# Patient Record
Sex: FEMALE | URBAN - METROPOLITAN AREA
[De-identification: names, ages, dates, MRNs, and addresses within clinical notes are randomized per-mention and may not be internally consistent; named-entity substitution may affect disease eponyms.]

---

## 2022-05-31 ENCOUNTER — MEDICAL CORRESPONDENCE (OUTPATIENT)
Dept: HEALTH INFORMATION MANAGEMENT | Facility: CLINIC | Age: 37
End: 2022-05-31
Payer: COMMERCIAL

## 2022-06-09 ENCOUNTER — TRANSCRIBE ORDERS (OUTPATIENT)
Dept: OTHER | Age: 37
End: 2022-06-09
Payer: COMMERCIAL

## 2022-06-09 DIAGNOSIS — Z87.442 HISTORY OF NEPHROLITHIASIS: ICD-10-CM

## 2022-06-09 DIAGNOSIS — N20.1 URETEROLITHIASIS: Primary | ICD-10-CM

## 2022-07-07 ENCOUNTER — MEDICAL CORRESPONDENCE (OUTPATIENT)
Dept: HEALTH INFORMATION MANAGEMENT | Facility: CLINIC | Age: 37
End: 2022-07-07

## 2022-07-07 ENCOUNTER — TRANSCRIBE ORDERS (OUTPATIENT)
Dept: OTHER | Age: 37
End: 2022-07-07

## 2022-07-07 DIAGNOSIS — G89.29 OTHER CHRONIC PAIN: Primary | ICD-10-CM

## 2022-07-07 DIAGNOSIS — R10.2 PELVIC PAIN IN FEMALE: ICD-10-CM

## 2022-07-07 DIAGNOSIS — R10.9 FLANK PAIN: ICD-10-CM

## 2022-07-11 ENCOUNTER — TRANSCRIBE ORDERS (OUTPATIENT)
Dept: OTHER | Age: 37
End: 2022-07-11

## 2022-07-11 DIAGNOSIS — N20.1 URETEROLITHIASIS: Primary | ICD-10-CM

## 2022-07-11 DIAGNOSIS — Z87.442 HISTORY OF NEPHROLITHIASIS: ICD-10-CM

## 2022-08-16 ENCOUNTER — OFFICE VISIT (OUTPATIENT)
Dept: OBGYN | Facility: CLINIC | Age: 37
End: 2022-08-16
Payer: COMMERCIAL

## 2022-08-16 VITALS
SYSTOLIC BLOOD PRESSURE: 117 MMHG | WEIGHT: 245.2 LBS | DIASTOLIC BLOOD PRESSURE: 82 MMHG | OXYGEN SATURATION: 98 % | HEART RATE: 95 BPM

## 2022-08-16 DIAGNOSIS — R10.2 CHRONIC PELVIC PAIN IN FEMALE: ICD-10-CM

## 2022-08-16 DIAGNOSIS — G89.29 CHRONIC PELVIC PAIN IN FEMALE: ICD-10-CM

## 2022-08-16 PROCEDURE — 99205 OFFICE O/P NEW HI 60 MIN: CPT | Performed by: OBSTETRICS & GYNECOLOGY

## 2022-08-16 RX ORDER — ALBUTEROL SULFATE 90 UG/1
1-2 AEROSOL, METERED RESPIRATORY (INHALATION)
COMMUNITY
Start: 2021-10-26

## 2022-08-16 RX ORDER — ONDANSETRON 4 MG/1
TABLET, ORALLY DISINTEGRATING ORAL
COMMUNITY
Start: 2022-08-13

## 2022-08-16 RX ORDER — EPINEPHRINE 0.3 MG/.3ML
0.3 INJECTION SUBCUTANEOUS
COMMUNITY

## 2022-08-16 RX ORDER — ESCITALOPRAM OXALATE 20 MG/1
TABLET ORAL
COMMUNITY
Start: 2022-07-27

## 2022-08-16 RX ORDER — ALBUTEROL SULFATE 90 UG/1
AEROSOL, METERED RESPIRATORY (INHALATION)
COMMUNITY
Start: 2022-02-10

## 2022-08-16 RX ORDER — NORTRIPTYLINE HCL 10 MG
CAPSULE ORAL
COMMUNITY
Start: 2022-08-09

## 2022-08-16 RX ORDER — SUMATRIPTAN 20 MG/1
SPRAY NASAL
COMMUNITY
Start: 2021-05-14

## 2022-08-16 RX ORDER — HYDROXYZINE HYDROCHLORIDE 25 MG/1
TABLET, FILM COATED ORAL
COMMUNITY
Start: 2020-08-27

## 2022-08-16 RX ORDER — PROPRANOLOL HYDROCHLORIDE 120 MG/1
CAPSULE, EXTENDED RELEASE ORAL
COMMUNITY
Start: 2022-08-13

## 2022-08-16 RX ORDER — ACETAMINOPHEN 500 MG
1000 TABLET ORAL
COMMUNITY
Start: 2021-08-06

## 2022-08-16 RX ORDER — CALCIUM CARBONATE/VITAMIN D3 500-10/5ML
1 LIQUID (ML) ORAL AT BEDTIME
COMMUNITY

## 2022-08-16 RX ORDER — TIZANIDINE 2 MG/1
4-6 TABLET ORAL
COMMUNITY
Start: 2022-06-09

## 2022-08-16 RX ORDER — OMEPRAZOLE 40 MG/1
CAPSULE, DELAYED RELEASE ORAL
COMMUNITY
Start: 2022-07-12

## 2022-08-16 NOTE — PROGRESS NOTES
Radha is a 36 year old  referred here by self for consultation regarding second Opinion. She is here with her mother. She has a long history of pelvic pain as detailed bellow by her gynecologist.  She has presently started with physical therapy and pain management through the pain clinic.  Her question relate to  why her surgeries have no resolved all per pelvic pain.  I explained that from the review of her records, she was treated with medical treatment, then minimally invasive surgeries and finally definitive hysterectomy.  Recent  imaging did not show any  abnormalities to explain her ongoing pains.  [facility]  Procedure Note    Filippo Ibarra MD - 2022   Formatting of this note might be different from the original.   IMPRESSION   COMPARISON:  CT 2022     TECHNIQUE:  Images were obtained through the abdomen and pelvis without contrast using a renal stone protocol.     FINDINGS:     LUNG BASES: Unremarkable.     LIVER: Unremarkable.     GALLBLADDER AND BILIARY TREE: Unremarkable. No intrahepatic or extrahepatic biliary ductal dilation.     PANCREAS: Unremarkable.     SPLEEN: Small splenule is.     ADRENALS: Unremarkable.     KIDNEYS AND URETERS: Unremarkable. No hydronephrosis.     VESSELS: No abdominal aortic aneurysm.     BOWEL: Unremarkable. No inflammatory changes or obstruction. Normal appendix     BLADDER: Decompressed.     REPRODUCTIVE ORGANS: Hysterectomy. No pelvic mass.     MESENTERY/PERITONEUM: No enlarged mesenteric lymph nodes. No ascites or free air. No focal fluid collection.     RETROPERITONEUM: No adenopathy.     ABDOMINAL WALL/SOFT TISSUES: Unremarkable.     BONES: Unremarkable.     IMPRESSION:  No findings identified to account for provided history of flank pain. No genitourinary stone. No obstructive uropathy. Normal CT appearance of the gallbladder. No dense gallstones.  Exam End: 22  3:46 PM    Specimen Collected: 22  3:35 PM  Last Resulted: 22  3:57 PM   Received From: [facility] (acquired by [employer] in 2014)  Result Received: 22  3:07 PM          Progress Notes  - documented in this encounter    Mercedes Young MD - 2022 11:00 AM CST  Formatting of this note is different from the original.  GYN Problem Focused Visit    Chief complaint: pelvic pain    HPI: Radha Sanon is a 36 y.o.  female who presents for pelvic pain. She is well known to me. Last visit 2021 for a post-op visit. She underwent TLH, bilateral salpingectomy for dysmenorrhea, pelvic pain, recurrent fibroids.   Following surgery she was diagnosed with kidney stones and required multiple procedures.   Since this visit she has been diagnosed with Graves disease and is being followed by endocrinology.  She continues to struggle with pelvic pain and has been working with Dr. Schober who is managing all narcotic Rx.   She had a pelvic uls this am with no etiology noted for pain. Ovaries not definitively seen, but no adnexal masses or cysts. She has had numerous studies (CT and uls) to try and figure out cause of pain.    Pain feels like it is vaginal. Unsure exact timing of pain onset, maybe September. She can no longer work due to the pain. She cannot tolerate any vaginal penetration due to pain. When asked if the pain is with insertion or deep she thinks it is both. Does not feel like there is pain with urination or BM.   Dull during the day. Sharp at night. Pain meds help. Not sure if muscle relaxants help.  Pain worse now after uls. No abnormal vaginal bleeding or discharge.       PMH:  Past Medical History:   Diagnosis Date     Asthma ([facility])     Chronic kidney disease ([facility])   stones     Headache     Heartburn     IIH (idiopathic intracranial hypertension)    was on Topamax for short time     Irritable bowel syndrome     Obesity ([facility])     Urinary tract infection     Varicella   Grave's disease.     PSH:  Past Surgical History:   Procedure Laterality Date      "CYSTOSCOPY 06/21/2021   Stent placement, kidney stones     HYSTERECTOMY     LAPAROSCOPY     LAPAROTOMY 5/27/15   Diagnostic hysteroscopy, resection of intracavitaryl fibroids with Myosure device, laparotomy, myomectomy     MYOMECTOMY 5/27/15   hysteroscopic and open     MYOMECTOMY 11/01/2012   hysteroscopic     MYOMECTOMY 09/06/2017   Hysteroscopic /Myosure     MYOMECTOMY 06/19/2019   Hysteroscopic     OPERATIVE HYSTEROSCOPY   myomectomy, 11/1/12     OPERATIVE HYSTEROSCOPY 06/19/2019   Diagnostic laparoscopy, hysteroscopy, myomectomy, placement of intrauterine balloon     TONSILLECTOMY     Total laparoscopic hysterectomy, bilateral salpingectomy 06/04/2021     WISDOM TEETH EXTRACTION     Physical exam:   /73 (BP Location: Left Arm, BP Cuff Size: Regular - Long)  Pulse 64  Ht 5' 8\" (1.727 m)  Wt 187 lb 8 oz (85 kg)  LMP 05/27/2020  BMI 28.51 kg/m    OBJECTIVE:   General appearance: alert, cooperative, appears guarded prior to exam and is tearful and in fetal position following exam.  Psychiatric: affect appropriate   Gyn: has normal appearing external genitalia, vaginal mucose and vaginal cuff. She has normal physiologic discharge. She has pain the moment I try to pass the speculum and start the digital exam. Pain intensified with palpation of pelvic floor, but really hard to determine if exact origin for pain.       Assessment: Pelvic pain following TLH, bilateral salpingectomy for recurrent fibroids    Plan: Her exam does not support any concern for post-op complication. She has healed completely and vaginal cuff is not the site of tenderness. I discussed with her the possible etiologies for pain including myofascial pelvic pain syndrome, neuropathic pain or central sensitization. Given her extreme pain with exam, I would like her to see the pain clinic first. I do not think pelvic floor PT could assess, nonetheless treat, any pelvic floor issues without some deescalating of current pain. I converted her " "pain consult to urgent given the number of ER visits, escalating pain, need/desire to use more and more narcotics.     Total time: 30 minutes   Time spent reviewing chart prior to appt, Taking a complete history, reviewing uls, preparing plan of care, communicating with Dr. Schober and referral to pain clinic.    Mercedes Young MD      Electronically signed by Mercedes Young MD at 02/24/2022 8:02 PM CST\"    ROS: Ten point review of systems was reviewed and negative except the above.        Past Medical History:   Diagnosis Date     Graves disease      Kidney stone      Migraines      Past Surgical History:   Procedure Laterality Date     ADENOIDECTOMY       EXTRACORPOREAL SHOCK WAVE LITHOTRIPSY (ESWL)       HYSTERECTOMY  06/04/2021     MYOMECTOMY       TONSILLECTOMY       There is no problem list on file for this patient.      ALL/Meds: Her medication and allergy histories were reviewed and are documented in their appropriate chart areas.    SH: - tob, - EtOH,     FH: Her family history was reviewed and documented in its appropriate chart area.    PE: /82   Pulse 95   Wt 111.2 kg (245 lb 3.2 oz)   SpO2 98%   Breastfeeding No   There is no height or weight on file to calculate BMI.    General Appearance:  healthy, alert, active, no distress  HEENT: NCAT    A/P    ICD-10-CM    1. Chronic pelvic pain in female  R10.2     G89.29      I recommended to continue with Physical Therapy and Pain Clinic and set new goals to mage pain control for her daily activities of life.  She and her mother had all her questions answered to her satisfaction.  Total time preparing to see patient with reviewing prior encounter and labs, face to face time,  and coordinating care on the same calendar date:60 mins    CEPHAS AGBEH, MD.   "

## 2022-08-16 NOTE — PATIENT INSTRUCTIONS
To Schedule an Appointment 24/7  Call: [phone]    If you have any questions regarding your visit, Please contact your care team.  [facility]: [phone]  [facility] HOURS TELEPHONE NUMBER   Cephas Agbeh, M.D.      [facility]-Surgery Scheduler  [facility]-Surgery Scheduler         Monday - [place]:    8:00 am-4:45 pm  Tuesday - [place]:   8:00 am-4:45 pm  Friday-[place]:       8:00 am-4:45 pm  Typical Surgery Day:  Wednesday [facility]   [address]   [place] [id number] [phone]   Fax [phone]    Imaging Scheduling all locations  [phone]      [facility] Labor and Delivery   [address] [place] [address] [address].   [place], [place] [address] [phone]    [facility]  [address] [facility] [address]  [phone]  Fax [phone]     Urgent Care locations:  [facility] Monday-Friday                               10 am - 8 pm  Saturday and Sunday                      9 am - 5 pm  Monday-Friday                              10 am- 8 pm  Saturday and Sunday                      9 am - 5 pm    [phone]   **Surgeries** Our Surgery Schedulers will contact you to schedule. If you do not receive a call within 3 business days, please call [phone].  If you need a medication refill, please contact your pharmacy. Please allow 3 business days for your refill to be completed.  As always, Thank you for trusting us with your healthcare needs!  see additional instructions from your care team below

## 2025-02-16 ENCOUNTER — HEALTH MAINTENANCE LETTER (OUTPATIENT)
Age: 40
End: 2025-02-16

## 2025-04-05 ENCOUNTER — TRANSFERRED RECORDS (OUTPATIENT)
Dept: MULTI SPECIALTY CLINIC | Facility: CLINIC | Age: 40
End: 2025-04-05

## 2025-04-05 LAB — PAP SMEAR - HIM PATIENT REPORTED: NORMAL

## 2025-04-08 PROBLEM — R10.9 ABDOMINAL PAIN: Status: ACTIVE | Noted: 2021-07-26

## 2025-04-08 PROBLEM — K76.0 FATTY LIVER: Status: ACTIVE | Noted: 2018-11-07

## 2025-04-08 PROBLEM — N30.00 ACUTE CYSTITIS WITHOUT HEMATURIA: Status: ACTIVE | Noted: 2025-04-08

## 2025-04-08 PROBLEM — N12 PYELONEPHRITIS: Status: ACTIVE | Noted: 2021-07-05

## 2025-04-08 PROBLEM — N20.1 URETEROLITHIASIS: Status: ACTIVE | Noted: 2022-03-19

## 2025-04-08 PROBLEM — Z87.442 HISTORY OF NEPHROLITHIASIS: Status: ACTIVE | Noted: 2018-05-03

## 2025-04-08 PROBLEM — N20.0 NEPHROLITHIASIS: Status: ACTIVE | Noted: 2021-06-21

## 2025-04-08 PROBLEM — E66.9 OBESITY (BMI 30-39.9): Status: ACTIVE | Noted: 2017-10-31

## 2025-04-08 PROBLEM — G89.4 CHRONIC PAIN DISORDER: Status: ACTIVE | Noted: 2022-04-19

## 2025-04-08 PROBLEM — R10.2 FEMALE PELVIC PAIN: Status: ACTIVE | Noted: 2020-06-20

## 2025-04-08 PROBLEM — K58.9 IRRITABLE BOWEL SYNDROME: Status: ACTIVE | Noted: 2021-07-05

## 2025-04-08 PROBLEM — Z90.710 STATUS POST LAPAROSCOPIC HYSTERECTOMY: Status: ACTIVE | Noted: 2021-06-04

## 2025-04-09 ENCOUNTER — OFFICE VISIT (OUTPATIENT)
Dept: OBGYN | Facility: CLINIC | Age: 40
End: 2025-04-09
Attending: OBSTETRICS & GYNECOLOGY
Payer: COMMERCIAL

## 2025-04-09 VITALS
SYSTOLIC BLOOD PRESSURE: 139 MMHG | WEIGHT: 254.6 LBS | HEIGHT: 68 IN | DIASTOLIC BLOOD PRESSURE: 88 MMHG | BODY MASS INDEX: 38.58 KG/M2 | HEART RATE: 66 BPM

## 2025-04-09 DIAGNOSIS — R10.2 CHRONIC PELVIC PAIN IN FEMALE: Primary | ICD-10-CM

## 2025-04-09 DIAGNOSIS — G89.29 CHRONIC PELVIC PAIN IN FEMALE: Primary | ICD-10-CM

## 2025-04-09 PROCEDURE — G0463 HOSPITAL OUTPT CLINIC VISIT: HCPCS | Performed by: OBSTETRICS & GYNECOLOGY

## 2025-04-09 NOTE — PROGRESS NOTES
Gynecology Visit Note  4/9/2025    Reason for visit: Chronic Pelvic Pain    HPI: Patient is a 38 yo P0 who presents today with concerns of chronic pelvic pain.  Patient has had longstanding issues with CPP initially treated with medication management, minimally invasive surgical methods (5 hysteroscopic myomectomies, one abdominal myomectomy via Pfannenstiel skin incision, one diagnostic laparoscopy) and ultimate completion of TLH, BS on 6/4/2021 for menorrhagia, dysmenorrhea, pelvic pain, recurrent fibroids and chronic blood loss anemia at outside facility. Operative findings described at time of TLH as Normal upper abdomen, Normal fallopian tubes and ovaries bilaterally, and Uterus with at least 3 visible subserousal fibroids. Following surgery she was diagnosed with kidney stones and required multiple procedures. Patient has a history Graves disease for which she follows with endocrinology.  She has previously been seen in a pain clinic and had a narcotic contract in the past with Dr. Schober.  Has trialed tizanidine, nortriptyline, gabapentin, an Pelvic PT.  Patient has had multiple imaging studies (Pelvic US and CT scan) completed since that have not shown any obvious pelvic pathology to cause her pain.    Her pain has been daily and persistent since her mid 20's, although she has been dealing with ovarian cysts and fibroids since her teens. She describes the pain, which has been unchanged over the years, as deep pelvic pain that is sharp, stabbing, and stinging. It is not associated with certain activities, although physical activity worsens it. She denies any dysuria, dyspareunia, tenderness of the vulva or vagina, constipation, or diarrhea. Pain medications have helped somewhat over the years, as well as cannabis, but nothing has ever fully relieved the pain. She is worried there is scar tissue, and is interested in surgery to identify the etiology because she feels that it cannot make the pain any worse and  nothing she is currently doing seems to be assisting either.    Prior laparoscopy/open procedures Operative Notes reviewed with findings below.  6/2019: Diagnostic laparoscopy at time of hysteroscopic myomectomy noted filmy adhesions to right fundal area of her uterus.   5/2015: XL, Abdominal myomectomy, Hysteroscopic myomectomy: Intrauterine cavity: Normal endometrium, with 2, 1cm pedunculated fibroids within uterine cavity. Left tubal ostia identified. To the right and anterior there was bulging in of a submucous vs transmural fibroid (transmural confirmed abdominally).  5 fibroids noted abdominally. Intramural - 8cm fundal and to patient's right, subserosal - 4cm anterior/fundal to the left, 3 pedunculated - each 1cm, 1 fundal, 1 anterior low right and 1 posterior left.  Interceed was placed over uterine incisions.      Past OB/GYN History:  P0  Menses: s/p [facility]  Pap smear History:Last 5/2019 NILM, HPV Negative, pathology from [facility] was benign  Sexually active: Yes, with boyfriend  STI History: Did not discuss  Contraception: s/p [facility]     Past Medical History:   Diagnosis Date    Asthma ([facility])    Chronic kidney disease ([facility])   stones    Headache    Heartburn    IIH (idiopathic intracranial hypertension) 2000   was on Topamax for short time    Irritable bowel syndrome    Obesity ([facility])    Urinary tract infection    Varicella   Grave's disease.     PSH:  Past Surgical History:   Procedure Laterality Date    CYSTOSCOPY 06/21/2021   Stent placement, kidney stones    HYSTERECTOMY    LAPAROSCOPY    LAPAROTOMY 5/27/15   Diagnostic hysteroscopy, resection of intracavitaryl fibroids with Myosure device, laparotomy, myomectomy    MYOMECTOMY 5/27/15   hysteroscopic and open    MYOMECTOMY 11/01/2012   hysteroscopic    MYOMECTOMY 09/06/2017   Hysteroscopic /Myosure    MYOMECTOMY 06/19/2019   Hysteroscopic    OPERATIVE HYSTEROSCOPY   myomectomy, 11/1/12    OPERATIVE HYSTEROSCOPY 06/19/2019   Diagnostic laparoscopy, hysteroscopy,  myomectomy, placement of intrauterine balloon    TONSILLECTOMY    Total laparoscopic hysterectomy, bilateral salpingectomy 06/04/2021    WISDOM TEETH EXTRACTION     Current Outpatient Medications   Medication Sig Dispense Refill    acetaminophen (TYLENOL) 500 MG tablet Take 1,000 mg by mouth      albuterol (PROAIR HFA/PROVENTIL HFA/VENTOLIN HFA) 108 (90 Base) MCG/ACT inhaler Inhale 1-2 puffs into the lungs      cholecalciferol (VITAMIN D3) 25 mcg (1000 units) capsule Take 1,000 Units by mouth      EPINEPHrine (ANY BX GENERIC EQUIV) 0.3 MG/0.3ML injection 2-pack Inject 0.3 mg into the muscle      escitalopram (LEXAPRO) 20 MG tablet       hydrOXYzine (ATARAX) 25 MG tablet TAKE 1 TO 2 TABLETS BY MOUTH TWICE DAILY      magnesium oxide 400 MG CAPS Take 1 tablet by mouth At Bedtime      nortriptyline (PAMELOR) 10 MG capsule       omeprazole (PRILOSEC) 40 MG DR capsule       ondansetron (ZOFRAN ODT) 4 MG ODT tab DISSOLVE 1-2 TABLETS BY MOUTH UP TO TWICE DAILY AS NEEDED FOR NAUSEA SYMPTOMS RELATED TO MIGRAINE. MAX 9 DAYS PER MONTH.      propranolol ER (INDERAL LA) 120 MG 24 hr capsule TAKE 1 CAPSULE BY MOUTH EVERY DAY      SUMAtriptan (IMITREX) 20 MG/ACT nasal spray USE 1 SPRAY AT ONSET OF MIGRAINE. MAY REPEAT IN 2 HOURS. MAX 2 PER DAY AND 9 DAYS PER MONTH      tiZANidine (ZANAFLEX) 2 MG tablet Take 4-6 mg by mouth      tiZANidine (ZANAFLEX) 4 MG tablet       VENTOLIN  (90 Base) MCG/ACT inhaler INHALE 1 TO 2 PUFFS BY MOUTH EVERY 4 HOURS AS NEEDED FOR WHEEZING      vitamin B-12 (CYANOCOBALAMIN) 1000 MCG tablet Take 1,000 mcg by mouth       No current facility-administered medications for this visit.      Allergies   Allergen Reactions    Aspirin Angioedema and Other (See Comments)     angioedema      Sulfa Antibiotics Difficulty breathing    Sulfamethoxazole-Trimethoprim Shortness Of Breath      Social History     Tobacco Use    Smoking status: Never    Smokeless tobacco: Never   Vaping Use    Vaping status: Never  "Used   Substance Use Topics    Alcohol use: Yes     Comment: maybe 4 times a month      Family History   Problem Relation Age of Onset    Diabetes Father       ROS: A complete 10 point ROS was conducted and was negative aside from that noted in the HPI    O:  /88   Pulse 66   Ht 1.727 m (5' 8\")   Wt 115.5 kg (254 lb 9.6 oz)   BMI 38.71 kg/m    General: NAD, A&Ox3  Resp: Nonlabored breathing    Pathology 6/4/2021:  Uterus and bilateral fallopian tubes, hysterectomy and salpingectomy-Benign uterus with benign leiomyomas, unremarkable fallopian tubes.     CT 11/1/2023:  FINDINGS:   LOWER CHEST: Normal.     HEPATOBILIARY: Mild hepatic steatosis. Normal gallbladder.     PANCREAS: Normal.     SPLEEN: Normal.     ADRENAL GLANDS: Normal.     KIDNEYS/BLADDER: Normal.     BOWEL: Normal appendix. No bowel obstruction or bowel wall thickening.     LYMPH NODES: Normal.     VASCULATURE: No abdominal aortic aneurysm.     PELVIC ORGANS: Absent uterus. No free fluid.     MUSCULOSKELETAL: Normal.     IMPRESSION:   1.  Mild hepatic steatosis.     2.  No focal inflammatory change.     A/P: 38 yo P0 presents with concerns of CPP and multiple failed modalities of management  1) CPP: Patient has tried all typical modalities for assisting with CPP including attempt with definitive management with TLH, Pain Clinic, PFT, Gabapentin, Tinazidine, and Nortriptyline and despite these interventions continues to have pain.  Patient is interested in having diagnostic surgery to evaluate for potential scar tissue or structural lesions that are contributing to her pain.  We discussed this could be considered in addition to peripheral nerve block.  She understands if we move towards surgery that if scar tissue is present and adhesions are taken down, there is always potential for these to come back and potentially be worse.  She also understands that even if a surgery is done, it may not relieve her pain.  Patient is searching for answers " and feels that current modalities have been unsuccessful in addressing her pain and negative impacts on quality of life.  Discussed with patient will discuss with robotic surgeons in our group about their openness to proceeding with RA-diagnostic laparoscopy, possible JABIER.  Will see if any further imaging warranted although all prior imaging has been without obvious findings.  Will contact patient once able to connect with robotic surgeons to see if anyone is open to completing this case and then make further plans from there.  Patient understands and is agreeable with this plan.    Dianna Peña MD

## 2025-04-09 NOTE — PATIENT INSTRUCTIONS
Thank you for trusting us with your care!   Please be aware, if you are on Mychart, you may see your results prior to your providers review. If labs are abnormal, we will call or message you on [employer] with a follow up plan.    If you need to contact us for questions about:  Symptoms, Scheduling & Medical Questions; Non-urgent (2-3 day response) A's Child message, Urgent (needing response today) [phone] (if after 3:30pm next day response)   Prescriptions: Please call your Pharmacy   Billing: [place] [phone] or [facility]:[phone]

## 2025-04-09 NOTE — LETTER
4/9/2025       RE: Radha Sanon  [address] [facility] [address]     Dear Colleague,    Thank you for referring your patient, Radha Sanon, to the [facility] [place] at [facility]. Please see a copy of my visit note below.    Gynecology Visit Note  4/9/2025    Reason for visit: Chronic Pelvic Pain    HPI: Patient is a 38 yo P0 who presents today with concerns of chronic pelvic pain.  Patient has had longstanding issues with CPP initially treated with medication management, minimally invasive surgical methods (5 hysteroscopic myomectomies, one abdominal myomectomy via Pfannenstiel skin incision, one diagnostic laparoscopy) and ultimate completion of TLH, BS on 6/4/2021 for menorrhagia, dysmenorrhea, pelvic pain, recurrent fibroids and chronic blood loss anemia at outside facility. Operative findings described at time of TLH as Normal upper abdomen, Normal fallopian tubes and ovaries bilaterally, and Uterus with at least 3 visible subserousal fibroids. Following surgery she was diagnosed with kidney stones and required multiple procedures. Patient has a history Graves disease for which she follows with endocrinology.  She has previously been seen in a pain clinic and had a narcotic contract in the past with Dr. Schober.  Has trialed tizanidine, nortriptyline, gabapentin, an Pelvic PT.  Patient has had multiple imaging studies (Pelvic US and CT scan) completed since that have not shown any obvious pelvic pathology to cause her pain.    Her pain has been daily and persistent since her mid 20's, although she has been dealing with ovarian cysts and fibroids since her teens. She describes the pain, which has been unchanged over the years, as deep pelvic pain that is sharp, stabbing, and stinging. It is not associated with certain activities, although physical activity worsens it. She denies any dysuria, dyspareunia, tenderness of  the vulva or vagina, constipation, or diarrhea. Pain medications have helped somewhat over the years, as well as cannabis, but nothing has ever fully relieved the pain. She is worried there is scar tissue, and is interested in surgery to identify the etiology because she feels that it cannot make the pain any worse and nothing she is currently doing seems to be assisting either.    Prior laparoscopy/open procedures Operative Notes reviewed with findings below.  6/2019: Diagnostic laparoscopy at time of hysteroscopic myomectomy noted filmy adhesions to right fundal area of her uterus.   5/2015: XL, Abdominal myomectomy, Hysteroscopic myomectomy: Intrauterine cavity: Normal endometrium, with 2, 1cm pedunculated fibroids within uterine cavity. Left tubal ostia identified. To the right and anterior there was bulging in of a submucous vs transmural fibroid (transmural confirmed abdominally).  5 fibroids noted abdominally. Intramural - 8cm fundal and to patient's right, subserosal - 4cm anterior/fundal to the left, 3 pedunculated - each 1cm, 1 fundal, 1 anterior low right and 1 posterior left.  Interceed was placed over uterine incisions.      Past OB/GYN History:  P0  Menses: s/p [facility]  Pap smear History:Last 5/2019 NILM, HPV Negative, pathology from [facility] was benign  Sexually active: Yes, with boyfriend  STI History: Did not discuss  Contraception: s/p [facility]     Past Medical History:   Diagnosis Date     Asthma ([facility])     Chronic kidney disease ([facility])   stones     Headache     Heartburn     IIH (idiopathic intracranial hypertension) 2000   was on Topamax for short time     Irritable bowel syndrome     Obesity ([facility])     Urinary tract infection     Varicella   Grave's disease.     PSH:  Past Surgical History:   Procedure Laterality Date     CYSTOSCOPY 06/21/2021   Stent placement, kidney stones     HYSTERECTOMY     LAPAROSCOPY     LAPAROTOMY 5/27/15   Diagnostic hysteroscopy, resection of intracavitaryl fibroids with Myosure  device, laparotomy, myomectomy     MYOMECTOMY 5/27/15   hysteroscopic and open     MYOMECTOMY 11/01/2012   hysteroscopic     MYOMECTOMY 09/06/2017   Hysteroscopic /Myosure     MYOMECTOMY 06/19/2019   Hysteroscopic     OPERATIVE HYSTEROSCOPY   myomectomy, 11/1/12     OPERATIVE HYSTEROSCOPY 06/19/2019   Diagnostic laparoscopy, hysteroscopy, myomectomy, placement of intrauterine balloon     TONSILLECTOMY     Total laparoscopic hysterectomy, bilateral salpingectomy 06/04/2021     WISDOM TEETH EXTRACTION     Current Outpatient Medications   Medication Sig Dispense Refill     acetaminophen (TYLENOL) 500 MG tablet Take 1,000 mg by mouth       albuterol (PROAIR HFA/PROVENTIL HFA/VENTOLIN HFA) 108 (90 Base) MCG/ACT inhaler Inhale 1-2 puffs into the lungs       cholecalciferol (VITAMIN D3) 25 mcg (1000 units) capsule Take 1,000 Units by mouth       EPINEPHrine (ANY BX GENERIC EQUIV) 0.3 MG/0.3ML injection 2-pack Inject 0.3 mg into the muscle       escitalopram (LEXAPRO) 20 MG tablet        hydrOXYzine (ATARAX) 25 MG tablet TAKE 1 TO 2 TABLETS BY MOUTH TWICE DAILY       magnesium oxide 400 MG CAPS Take 1 tablet by mouth At Bedtime       nortriptyline (PAMELOR) 10 MG capsule        omeprazole (PRILOSEC) 40 MG DR capsule        ondansetron (ZOFRAN ODT) 4 MG ODT tab DISSOLVE 1-2 TABLETS BY MOUTH UP TO TWICE DAILY AS NEEDED FOR NAUSEA SYMPTOMS RELATED TO MIGRAINE. MAX 9 DAYS PER MONTH.       propranolol ER (INDERAL LA) 120 MG 24 hr capsule TAKE 1 CAPSULE BY MOUTH EVERY DAY       SUMAtriptan (IMITREX) 20 MG/ACT nasal spray USE 1 SPRAY AT ONSET OF MIGRAINE. MAY REPEAT IN 2 HOURS. MAX 2 PER DAY AND 9 DAYS PER MONTH       tiZANidine (ZANAFLEX) 2 MG tablet Take 4-6 mg by mouth       tiZANidine (ZANAFLEX) 4 MG tablet        VENTOLIN  (90 Base) MCG/ACT inhaler INHALE 1 TO 2 PUFFS BY MOUTH EVERY 4 HOURS AS NEEDED FOR WHEEZING       vitamin B-12 (CYANOCOBALAMIN) 1000 MCG tablet Take 1,000 mcg by mouth       No current  "facility-administered medications for this visit.      Allergies   Allergen Reactions     Aspirin Angioedema and Other (See Comments)     angioedema       Sulfa Antibiotics Difficulty breathing     Sulfamethoxazole-Trimethoprim Shortness Of Breath      Social History     Tobacco Use     Smoking status: Never     Smokeless tobacco: Never   Vaping Use     Vaping status: Never Used   Substance Use Topics     Alcohol use: Yes     Comment: maybe 4 times a month      Family History   Problem Relation Age of Onset     Diabetes Father       ROS: A complete 10 point ROS was conducted and was negative aside from that noted in the HPI    O:  /88   Pulse 66   Ht 1.727 m (5' 8\")   Wt 115.5 kg (254 lb 9.6 oz)   BMI 38.71 kg/m    General: NAD, A&Ox3  Resp: Nonlabored breathing    Pathology 6/4/2021:  Uterus and bilateral fallopian tubes, hysterectomy and salpingectomy-Benign uterus with benign leiomyomas, unremarkable fallopian tubes.     CT 11/1/2023:  FINDINGS:   LOWER CHEST: Normal.     HEPATOBILIARY: Mild hepatic steatosis. Normal gallbladder.     PANCREAS: Normal.     SPLEEN: Normal.     ADRENAL GLANDS: Normal.     KIDNEYS/BLADDER: Normal.     BOWEL: Normal appendix. No bowel obstruction or bowel wall thickening.     LYMPH NODES: Normal.     VASCULATURE: No abdominal aortic aneurysm.     PELVIC ORGANS: Absent uterus. No free fluid.     MUSCULOSKELETAL: Normal.     IMPRESSION:   1.  Mild hepatic steatosis.     2.  No focal inflammatory change.     A/P: 38 yo P0 presents with concerns of CPP and multiple failed modalities of management  1) CPP: Patient has tried all typical modalities for assisting with CPP including attempt with definitive management with TL, Pain Clinic, PFT, Gabapentin, Tinazidine, and Nortriptyline and despite these interventions continues to have pain.  Patient is interested in having diagnostic surgery to evaluate for potential scar tissue or structural lesions that are contributing to her " pain.  We discussed this could be considered in addition to peripheral nerve block.  She understands if we move towards surgery that if scar tissue is present and adhesions are taken down, there is always potential for these to come back and potentially be worse.  She also understands that even if a surgery is done, it may not relieve her pain.  Patient is searching for answers and feels that current modalities have been unsuccessful in addressing her pain and negative impacts on quality of life.  Discussed with patient will discuss with robotic surgeons in our group about their openness to proceeding with RA-diagnostic laparoscopy, possible JABIER.  Will see if any further imaging warranted although all prior imaging has been without obvious findings.  Will contact patient once able to connect with robotic surgeons to see if anyone is open to completing this case and then make further plans from there.  Patient understands and is agreeable with this plan.    Dianna Peña MD      Again, thank you for allowing me to participate in the care of your patient.      Sincerely,    Dianna Peña MD

## 2025-04-16 ENCOUNTER — TELEPHONE (OUTPATIENT)
Dept: OBGYN | Facility: CLINIC | Age: 40
End: 2025-04-16
Payer: COMMERCIAL

## 2025-04-16 NOTE — TELEPHONE ENCOUNTER
Dianna Peña MD  [facility]  Caller: Unspecified (Today,  1:29 PM)  She needs to go to the ED, she did not have pain like that when I assessed her.  I am still waiting to hear back from the robotic docs, I will send a repeat message tomorrow.  She has a pain clinic she has followed with in the past and she also should reach out to them as she has previously had a pain contract with them.  I do not want to get in a situation where we are giving her narcotics.  That being said, she should absolutely go to ED as that pain is much different than when she saw me and walked into our clinic without issue!  Thank you!      Relayed information to patient. No questions at end of message.

## 2025-04-16 NOTE — TELEPHONE ENCOUNTER
Patient requesting pain medication from Dr. Peña for 10/10 pain. LOV w/ Dr. Peña on 4/9/25- Patient c/o 10/10 pelvic pain that is shooting down her leg. She has tried laying on the ground and standing in the shower. RN may have misheard patient, but may have heard patient state she tried Marijuana (a lot of background noise and asked the patient to repeat several times and couldn't hear word). None of the relief measures the patient has tried are working. RN advised patient to go to the ED since her pain is 10/10. OV notes state that Dr. Peña will reach out to robotics provider about surgery. Will message Dr. Peña for advise.

## 2025-04-28 ENCOUNTER — TELEPHONE (OUTPATIENT)
Dept: OBGYN | Facility: CLINIC | Age: 40
End: 2025-04-28
Payer: COMMERCIAL

## 2025-04-28 NOTE — TELEPHONE ENCOUNTER
Attempted to call patient to discuss what we could offer her with regards to medical management and surgical management of her chronic pain after discussing consensus with our robotic surgeons.  Patient did not answer phone and left message that I was trying to connect with her to make plans and see what she would want to do.  Will attempt to give patient a call again later today between meetings.    Dianna Peña MD

## 2025-05-07 ENCOUNTER — TELEPHONE (OUTPATIENT)
Dept: OBGYN | Facility: CLINIC | Age: 40
End: 2025-05-07
Payer: COMMERCIAL

## 2025-05-07 NOTE — TELEPHONE ENCOUNTER
[facility] Call Center    Phone Message    May a detailed message be left on voicemail: yes     Reason for Call: Other: Pt is returning the provider's call from 4/28 as she never heard back. Pt would like to discuss further what her options are and proceed with next steps. Please review and call pt to discuss.     Action Taken: Other: [facility]    Travel Screening: Not Applicable     Date of Service:

## 2025-05-12 NOTE — CONFIDENTIAL NOTE
"\"I did attempt to call this patient today. It went to [employer]. It says the voicemail is full. I am documenting this so people know I did attempt to reach out to her but can not leave a message saying I am doing this to her. If she calls please let her know attempts have been made. I will continue to try to connect with her\". Dr. Peña (5/8/25)    My Chart message sent to Radha with this information  "

## 2025-07-02 ENCOUNTER — MYC MEDICAL ADVICE (OUTPATIENT)
Dept: OBGYN | Facility: CLINIC | Age: 40
End: 2025-07-02
Payer: COMMERCIAL

## 2025-07-02 DIAGNOSIS — N95.1 PERIMENOPAUSAL VASOMOTOR SYMPTOMS: Primary | ICD-10-CM

## 2025-07-02 DIAGNOSIS — R10.2 CHRONIC PELVIC PAIN IN FEMALE: Primary | ICD-10-CM

## 2025-07-02 DIAGNOSIS — G89.29 CHRONIC PELVIC PAIN IN FEMALE: Primary | ICD-10-CM

## 2025-07-02 RX ORDER — ACETAMINOPHEN 325 MG/1
975 TABLET ORAL ONCE
OUTPATIENT
Start: 2025-07-02 | End: 2025-07-02

## 2025-07-02 RX ORDER — PHENAZOPYRIDINE HYDROCHLORIDE 100 MG/1
200 TABLET, FILM COATED ORAL ONCE
OUTPATIENT
Start: 2025-07-02 | End: 2025-07-02

## 2025-07-02 NOTE — PROGRESS NOTES
Able to connect with patient to discuss desires for future management.  Per patient has tried GnRH meds in past and did not have any benefit from this.  She continues to struggle with the same symptoms she had when I last saw her in 4/2025.  She does highly desire to proceed with surgery to try to get some answers.  We did discuss considerations for bilateral oophorectomy at the time of her surgery as these are her last remaining pelvic structures following her TLH, BS in 6/2021.  We discussed risks ane benefits of this and considerations for surgical menopause which we could manage with ERT postoperatively.  Patient states she has been having symptoms of hot flashes and night sweats so things maybe she is already going through this transition although is unsure if could also be related to her Graves disease.  Did offer to get labs to evaluate for potential menopause given can not be directed by bleeding given prior hysterectomy.  Patient is open to doing this.  That being said, at this point, patient would want to proceed with surgery as does not want to have regrets and avoid need for future surgery.  This is reasonable.  I did have a very marquis discussion with the patient that while we would hope this surgery may help with her pain, it may not, and she does voice understanding of this but also feels after having the surgery at least she will have peace there is nothing else she could have done.  Plan for RA-BSO, possible JABIER.  Surgical request placed today.  Discussed need for preop with PCP within 30 days of surgery date.  Also discussed expected recovery from procedure and planned followup postoperatively.      Dianna Peña MD

## 2025-07-09 ENCOUNTER — TELEPHONE (OUTPATIENT)
Dept: OBGYN | Facility: CLINIC | Age: 40
End: 2025-07-09
Payer: COMMERCIAL

## 2025-07-09 NOTE — TELEPHONE ENCOUNTER
Spoke with patient, scheduled surgery. Patient is aware of date, time, location, prep instructions, need for H&P within 30 days.    Type of surgery: ROBOT-ASSISTED BILATERAL OOPHORECTOMY, (Bilateral)   POSSIBLE LYSIS OF ADHESIONS   Location of surgery: [facility] [place] [facility]  Date and time of surgery: 8/22/25 @ 11:10am  Surgeon: Dr. Ivy Bird  Pre-Op H&P Date: 10-30 days with PCP  Post-Op Appt Date: 4-6 weeks after surgery   Packet sent out: Yes  Pre-cert/Authorization completed:  No  Date: 7/9/25

## 2025-07-22 ENCOUNTER — TELEPHONE (OUTPATIENT)
Dept: OBGYN | Facility: CLINIC | Age: 40
End: 2025-07-22
Payer: COMMERCIAL

## 2025-07-22 NOTE — TELEPHONE ENCOUNTER
----- Message from Catherine SINGH sent at 7/17/2025 10:01 AM CDT -----  Regarding: Post op with Marge Whalen can someone reach out to this patient and help them schedule a 4-6 week post op appointment with Dr. Bird    DOS: 8/22    Please remind them they need a pre-op 10-30 days prior to the procedure    Thanks    Rosalva

## 2025-08-20 ENCOUNTER — OFFICE VISIT (OUTPATIENT)
Dept: FAMILY MEDICINE | Facility: CLINIC | Age: 40
End: 2025-08-20
Payer: COMMERCIAL

## 2025-08-20 ENCOUNTER — TELEPHONE (OUTPATIENT)
Dept: FAMILY MEDICINE | Facility: CLINIC | Age: 40
End: 2025-08-20

## 2025-08-20 VITALS
SYSTOLIC BLOOD PRESSURE: 134 MMHG | TEMPERATURE: 97.3 F | DIASTOLIC BLOOD PRESSURE: 80 MMHG | HEART RATE: 63 BPM | HEIGHT: 68 IN | BODY MASS INDEX: 34.4 KG/M2 | OXYGEN SATURATION: 99 % | RESPIRATION RATE: 16 BRPM | WEIGHT: 227 LBS

## 2025-08-20 DIAGNOSIS — Z01.818 PREOP GENERAL PHYSICAL EXAM: Primary | ICD-10-CM

## 2025-08-20 DIAGNOSIS — R10.2 PELVIC PAIN IN FEMALE: ICD-10-CM

## 2025-08-20 LAB
ANION GAP SERPL CALCULATED.3IONS-SCNC: 10 MMOL/L (ref 7–15)
BUN SERPL-MCNC: 6.2 MG/DL (ref 6–20)
CALCIUM SERPL-MCNC: 9.3 MG/DL (ref 8.8–10.4)
CHLORIDE SERPL-SCNC: 102 MMOL/L (ref 98–107)
CREAT SERPL-MCNC: 0.91 MG/DL (ref 0.51–0.95)
EGFRCR SERPLBLD CKD-EPI 2021: 81 ML/MIN/1.73M2
ERYTHROCYTE [DISTWIDTH] IN BLOOD BY AUTOMATED COUNT: 12.6 % (ref 10–15)
GLUCOSE SERPL-MCNC: 84 MG/DL (ref 70–99)
HCO3 SERPL-SCNC: 28 MMOL/L (ref 22–29)
HCT VFR BLD AUTO: 38.6 % (ref 35–47)
HGB BLD-MCNC: 12 G/DL (ref 11.7–15.7)
MCH RBC QN AUTO: 26.5 PG (ref 26.5–33)
MCHC RBC AUTO-ENTMCNC: 31.1 G/DL (ref 31.5–36.5)
MCV RBC AUTO: 85.2 FL (ref 78–100)
PLATELET # BLD AUTO: 235 10E3/UL (ref 150–450)
POTASSIUM SERPL-SCNC: 4.1 MMOL/L (ref 3.4–5.3)
RBC # BLD AUTO: 4.53 10E6/UL (ref 3.8–5.2)
SODIUM SERPL-SCNC: 140 MMOL/L (ref 135–145)
WBC # BLD AUTO: 6.72 10E3/UL (ref 4–11)

## 2025-08-20 PROCEDURE — 80048 BASIC METABOLIC PNL TOTAL CA: CPT | Performed by: NURSE PRACTITIONER

## 2025-08-20 PROCEDURE — 1125F AMNT PAIN NOTED PAIN PRSNT: CPT | Performed by: NURSE PRACTITIONER

## 2025-08-20 PROCEDURE — 85027 COMPLETE CBC AUTOMATED: CPT | Performed by: NURSE PRACTITIONER

## 2025-08-20 PROCEDURE — 99214 OFFICE O/P EST MOD 30 MIN: CPT | Performed by: NURSE PRACTITIONER

## 2025-08-20 PROCEDURE — 36415 COLL VENOUS BLD VENIPUNCTURE: CPT | Performed by: NURSE PRACTITIONER

## 2025-08-20 PROCEDURE — 3079F DIAST BP 80-89 MM HG: CPT | Performed by: NURSE PRACTITIONER

## 2025-08-20 PROCEDURE — 3075F SYST BP GE 130 - 139MM HG: CPT | Performed by: NURSE PRACTITIONER

## 2025-08-20 ASSESSMENT — ASTHMA QUESTIONNAIRES
QUESTION_1 LAST FOUR WEEKS HOW MUCH OF THE TIME DID YOUR ASTHMA KEEP YOU FROM GETTING AS MUCH DONE AT WORK, SCHOOL OR AT HOME: NONE OF THE TIME
QUESTION_5 LAST FOUR WEEKS HOW WOULD YOU RATE YOUR ASTHMA CONTROL: COMPLETELY CONTROLLED
QUESTION_4 LAST FOUR WEEKS HOW OFTEN HAVE YOU USED YOUR RESCUE INHALER OR NEBULIZER MEDICATION (SUCH AS ALBUTEROL): TWO OR THREE TIMES PER WEEK
QUESTION_3 LAST FOUR WEEKS HOW OFTEN DID YOUR ASTHMA SYMPTOMS (WHEEZING, COUGHING, SHORTNESS OF BREATH, CHEST TIGHTNESS OR PAIN) WAKE YOU UP AT NIGHT OR EARLIER THAN USUAL IN THE MORNING: NOT AT ALL
ACT_TOTALSCORE: 22
QUESTION_2 LAST FOUR WEEKS HOW OFTEN HAVE YOU HAD SHORTNESS OF BREATH: ONCE OR TWICE A WEEK

## 2025-08-20 ASSESSMENT — PAIN SCALES - GENERAL: PAINLEVEL_OUTOF10: MODERATE PAIN (5)

## 2025-08-21 ENCOUNTER — ANESTHESIA EVENT (OUTPATIENT)
Dept: SURGERY | Facility: CLINIC | Age: 40
End: 2025-08-21
Payer: COMMERCIAL

## 2025-08-22 ENCOUNTER — ANESTHESIA (OUTPATIENT)
Dept: SURGERY | Facility: CLINIC | Age: 40
End: 2025-08-22
Payer: COMMERCIAL

## 2025-08-22 ENCOUNTER — HOSPITAL ENCOUNTER (OUTPATIENT)
Facility: CLINIC | Age: 40
Discharge: HOME | End: 2025-08-22
Attending: OBSTETRICS & GYNECOLOGY | Admitting: OBSTETRICS & GYNECOLOGY
Payer: COMMERCIAL

## 2025-08-22 VITALS
HEIGHT: 68 IN | DIASTOLIC BLOOD PRESSURE: 80 MMHG | BODY MASS INDEX: 34.41 KG/M2 | HEART RATE: 66 BPM | TEMPERATURE: 97.6 F | WEIGHT: 227.07 LBS | OXYGEN SATURATION: 97 % | RESPIRATION RATE: 16 BRPM | SYSTOLIC BLOOD PRESSURE: 130 MMHG

## 2025-08-22 DIAGNOSIS — G89.18 POSTOPERATIVE PAIN: Primary | ICD-10-CM

## 2025-08-22 DIAGNOSIS — R10.2 CHRONIC PELVIC PAIN IN FEMALE: ICD-10-CM

## 2025-08-22 DIAGNOSIS — G89.29 CHRONIC PELVIC PAIN IN FEMALE: ICD-10-CM

## 2025-08-22 LAB
ABO + RH BLD: NORMAL
BLD GP AB SCN SERPL QL: NEGATIVE
HGB BLD-MCNC: 12.3 G/DL (ref 11.7–15.7)
MCV RBC AUTO: 84 FL (ref 78–100)
SPECIMEN EXP DATE BLD: NORMAL

## 2025-08-22 PROCEDURE — 258N000003 HC RX IP 258 OP 636: Performed by: NURSE ANESTHETIST, CERTIFIED REGISTERED

## 2025-08-22 PROCEDURE — 710N000010 HC RECOVERY PHASE 1, LEVEL 2, PER MIN: Performed by: OBSTETRICS & GYNECOLOGY

## 2025-08-22 PROCEDURE — 250N000011 HC RX IP 250 OP 636: Performed by: NURSE ANESTHETIST, CERTIFIED REGISTERED

## 2025-08-22 PROCEDURE — 710N000012 HC RECOVERY PHASE 2, PER MINUTE: Performed by: OBSTETRICS & GYNECOLOGY

## 2025-08-22 PROCEDURE — 250N000009 HC RX 250: Performed by: NURSE ANESTHETIST, CERTIFIED REGISTERED

## 2025-08-22 PROCEDURE — 250N000013 HC RX MED GY IP 250 OP 250 PS 637: Performed by: OBSTETRICS & GYNECOLOGY

## 2025-08-22 PROCEDURE — 250N000011 HC RX IP 250 OP 636: Performed by: ANESTHESIOLOGY

## 2025-08-22 PROCEDURE — 250N000009 HC RX 250: Performed by: ANESTHESIOLOGY

## 2025-08-22 PROCEDURE — 88305 TISSUE EXAM BY PATHOLOGIST: CPT | Mod: TC | Performed by: OBSTETRICS & GYNECOLOGY

## 2025-08-22 PROCEDURE — 86850 RBC ANTIBODY SCREEN: CPT | Performed by: STUDENT IN AN ORGANIZED HEALTH CARE EDUCATION/TRAINING PROGRAM

## 2025-08-22 PROCEDURE — 999N000040 HC STATISTIC CONSULT NO CHARGE VASC ACCESS

## 2025-08-22 PROCEDURE — 85018 HEMOGLOBIN: CPT | Performed by: OBSTETRICS & GYNECOLOGY

## 2025-08-22 PROCEDURE — 58661 LAPAROSCOPY REMOVE ADNEXA: CPT | Mod: 50 | Performed by: OBSTETRICS & GYNECOLOGY

## 2025-08-22 PROCEDURE — S2900 ROBOTIC SURGICAL SYSTEM: HCPCS | Mod: GC | Performed by: OBSTETRICS & GYNECOLOGY

## 2025-08-22 PROCEDURE — 370N000017 HC ANESTHESIA TECHNICAL FEE, PER MIN: Performed by: OBSTETRICS & GYNECOLOGY

## 2025-08-22 PROCEDURE — 88305 TISSUE EXAM BY PATHOLOGIST: CPT | Mod: 26 | Performed by: PATHOLOGY

## 2025-08-22 PROCEDURE — 360N000080 HC SURGERY LEVEL 7, PER MIN: Performed by: OBSTETRICS & GYNECOLOGY

## 2025-08-22 PROCEDURE — 250N000009 HC RX 250: Performed by: STUDENT IN AN ORGANIZED HEALTH CARE EDUCATION/TRAINING PROGRAM

## 2025-08-22 PROCEDURE — 36415 COLL VENOUS BLD VENIPUNCTURE: CPT | Performed by: OBSTETRICS & GYNECOLOGY

## 2025-08-22 PROCEDURE — 250N000011 HC RX IP 250 OP 636: Performed by: STUDENT IN AN ORGANIZED HEALTH CARE EDUCATION/TRAINING PROGRAM

## 2025-08-22 PROCEDURE — 250N000013 HC RX MED GY IP 250 OP 250 PS 637: Performed by: STUDENT IN AN ORGANIZED HEALTH CARE EDUCATION/TRAINING PROGRAM

## 2025-08-22 PROCEDURE — 272N000001 HC OR GENERAL SUPPLY STERILE: Performed by: OBSTETRICS & GYNECOLOGY

## 2025-08-22 PROCEDURE — 250N000025 HC SEVOFLURANE, PER MIN: Performed by: OBSTETRICS & GYNECOLOGY

## 2025-08-22 PROCEDURE — 250N000013 HC RX MED GY IP 250 OP 250 PS 637

## 2025-08-22 PROCEDURE — 999N000141 HC STATISTIC PRE-PROCEDURE NURSING ASSESSMENT: Performed by: OBSTETRICS & GYNECOLOGY

## 2025-08-22 RX ORDER — SODIUM CHLORIDE, SODIUM LACTATE, POTASSIUM CHLORIDE, CALCIUM CHLORIDE 600; 310; 30; 20 MG/100ML; MG/100ML; MG/100ML; MG/100ML
INJECTION, SOLUTION INTRAVENOUS CONTINUOUS PRN
Status: DISCONTINUED | OUTPATIENT
Start: 2025-08-22 | End: 2025-08-22

## 2025-08-22 RX ORDER — ONDANSETRON 2 MG/ML
INJECTION INTRAMUSCULAR; INTRAVENOUS PRN
Status: DISCONTINUED | OUTPATIENT
Start: 2025-08-22 | End: 2025-08-22

## 2025-08-22 RX ORDER — HYDROXYZINE HYDROCHLORIDE 50 MG/ML
25 INJECTION, SOLUTION INTRAMUSCULAR EVERY 6 HOURS PRN
Status: DISCONTINUED | OUTPATIENT
Start: 2025-08-22 | End: 2025-08-22 | Stop reason: HOSPADM

## 2025-08-22 RX ORDER — SODIUM CHLORIDE, SODIUM LACTATE, POTASSIUM CHLORIDE, CALCIUM CHLORIDE 600; 310; 30; 20 MG/100ML; MG/100ML; MG/100ML; MG/100ML
INJECTION, SOLUTION INTRAVENOUS CONTINUOUS
Status: DISCONTINUED | OUTPATIENT
Start: 2025-08-22 | End: 2025-08-22 | Stop reason: HOSPADM

## 2025-08-22 RX ORDER — PHENAZOPYRIDINE HYDROCHLORIDE 200 MG/1
200 TABLET, FILM COATED ORAL ONCE
Status: COMPLETED | OUTPATIENT
Start: 2025-08-22 | End: 2025-08-22

## 2025-08-22 RX ORDER — GABAPENTIN 300 MG/1
300 CAPSULE ORAL
Status: COMPLETED | OUTPATIENT
Start: 2025-08-22 | End: 2025-08-22

## 2025-08-22 RX ORDER — NALOXONE HYDROCHLORIDE 0.4 MG/ML
0.4 INJECTION, SOLUTION INTRAMUSCULAR; INTRAVENOUS; SUBCUTANEOUS
Status: DISCONTINUED | OUTPATIENT
Start: 2025-08-22 | End: 2025-08-22 | Stop reason: HOSPADM

## 2025-08-22 RX ORDER — OXYCODONE HYDROCHLORIDE 5 MG/1
5 TABLET ORAL EVERY 6 HOURS PRN
Qty: 12 TABLET | Refills: 0 | Status: SHIPPED | OUTPATIENT
Start: 2025-08-22 | End: 2025-08-25

## 2025-08-22 RX ORDER — BUPIVACAINE HYDROCHLORIDE 2.5 MG/ML
INJECTION, SOLUTION EPIDURAL; INFILTRATION; INTRACAUDAL; PERINEURAL
Status: COMPLETED | OUTPATIENT
Start: 2025-08-22 | End: 2025-08-22

## 2025-08-22 RX ORDER — AMOXICILLIN 250 MG
1-2 CAPSULE ORAL 2 TIMES DAILY PRN
Qty: 30 TABLET | Refills: 0 | Status: SHIPPED | OUTPATIENT
Start: 2025-08-22

## 2025-08-22 RX ORDER — HYDROXYZINE HYDROCHLORIDE 25 MG/1
25 TABLET, FILM COATED ORAL
Status: DISCONTINUED | OUTPATIENT
Start: 2025-08-22 | End: 2025-08-22 | Stop reason: HOSPADM

## 2025-08-22 RX ORDER — DEXAMETHASONE SODIUM PHOSPHATE 4 MG/ML
INJECTION, SOLUTION INTRA-ARTICULAR; INTRALESIONAL; INTRAMUSCULAR; INTRAVENOUS; SOFT TISSUE PRN
Status: DISCONTINUED | OUTPATIENT
Start: 2025-08-22 | End: 2025-08-22

## 2025-08-22 RX ORDER — LIDOCAINE HYDROCHLORIDE 20 MG/ML
INJECTION, SOLUTION INFILTRATION; PERINEURAL PRN
Status: DISCONTINUED | OUTPATIENT
Start: 2025-08-22 | End: 2025-08-22

## 2025-08-22 RX ORDER — PROPOFOL 10 MG/ML
INJECTION, EMULSION INTRAVENOUS PRN
Status: DISCONTINUED | OUTPATIENT
Start: 2025-08-22 | End: 2025-08-22

## 2025-08-22 RX ORDER — FENTANYL CITRATE 50 UG/ML
INJECTION, SOLUTION INTRAMUSCULAR; INTRAVENOUS PRN
Status: DISCONTINUED | OUTPATIENT
Start: 2025-08-22 | End: 2025-08-22

## 2025-08-22 RX ORDER — NALOXONE HYDROCHLORIDE 0.4 MG/ML
0.1 INJECTION, SOLUTION INTRAMUSCULAR; INTRAVENOUS; SUBCUTANEOUS
Status: DISCONTINUED | OUTPATIENT
Start: 2025-08-22 | End: 2025-08-22 | Stop reason: HOSPADM

## 2025-08-22 RX ORDER — LABETALOL HYDROCHLORIDE 5 MG/ML
10 INJECTION, SOLUTION INTRAVENOUS
Status: DISCONTINUED | OUTPATIENT
Start: 2025-08-22 | End: 2025-08-22 | Stop reason: HOSPADM

## 2025-08-22 RX ORDER — OXYCODONE HYDROCHLORIDE 5 MG/1
5 TABLET ORAL
Status: COMPLETED | OUTPATIENT
Start: 2025-08-22 | End: 2025-08-22

## 2025-08-22 RX ORDER — LIDOCAINE 40 MG/G
CREAM TOPICAL
Status: DISCONTINUED | OUTPATIENT
Start: 2025-08-22 | End: 2025-08-22 | Stop reason: HOSPADM

## 2025-08-22 RX ORDER — FENTANYL CITRATE 50 UG/ML
25-50 INJECTION, SOLUTION INTRAMUSCULAR; INTRAVENOUS
Status: DISCONTINUED | OUTPATIENT
Start: 2025-08-22 | End: 2025-08-22 | Stop reason: HOSPADM

## 2025-08-22 RX ORDER — PROPOFOL 10 MG/ML
INJECTION, EMULSION INTRAVENOUS CONTINUOUS PRN
Status: DISCONTINUED | OUTPATIENT
Start: 2025-08-22 | End: 2025-08-22

## 2025-08-22 RX ORDER — FLUMAZENIL 0.1 MG/ML
0.2 INJECTION, SOLUTION INTRAVENOUS
Status: DISCONTINUED | OUTPATIENT
Start: 2025-08-22 | End: 2025-08-22 | Stop reason: HOSPADM

## 2025-08-22 RX ORDER — HYDROMORPHONE HYDROCHLORIDE 1 MG/ML
0.2 INJECTION, SOLUTION INTRAMUSCULAR; INTRAVENOUS; SUBCUTANEOUS EVERY 5 MIN PRN
Status: DISCONTINUED | OUTPATIENT
Start: 2025-08-22 | End: 2025-08-22 | Stop reason: HOSPADM

## 2025-08-22 RX ORDER — ONDANSETRON 2 MG/ML
4 INJECTION INTRAMUSCULAR; INTRAVENOUS EVERY 30 MIN PRN
Status: DISCONTINUED | OUTPATIENT
Start: 2025-08-22 | End: 2025-08-22 | Stop reason: HOSPADM

## 2025-08-22 RX ORDER — ACETAMINOPHEN 325 MG/1
975 TABLET ORAL ONCE
Status: COMPLETED | OUTPATIENT
Start: 2025-08-22 | End: 2025-08-22

## 2025-08-22 RX ORDER — HYDROMORPHONE HYDROCHLORIDE 1 MG/ML
0.4 INJECTION, SOLUTION INTRAMUSCULAR; INTRAVENOUS; SUBCUTANEOUS EVERY 5 MIN PRN
Status: DISCONTINUED | OUTPATIENT
Start: 2025-08-22 | End: 2025-08-22 | Stop reason: HOSPADM

## 2025-08-22 RX ORDER — ACETAMINOPHEN 325 MG/1
975 TABLET ORAL EVERY 6 HOURS PRN
Qty: 50 TABLET | Refills: 0 | Status: SHIPPED | OUTPATIENT
Start: 2025-08-22

## 2025-08-22 RX ORDER — SCOPOLAMINE 1 MG/3D
1 PATCH, EXTENDED RELEASE TRANSDERMAL ONCE
Status: DISCONTINUED | OUTPATIENT
Start: 2025-08-22 | End: 2025-08-22 | Stop reason: HOSPADM

## 2025-08-22 RX ORDER — ACETAMINOPHEN 325 MG/1
975 TABLET ORAL
Status: COMPLETED | OUTPATIENT
Start: 2025-08-22 | End: 2025-08-22

## 2025-08-22 RX ORDER — DEXMEDETOMIDINE HYDROCHLORIDE 4 UG/ML
INJECTION, SOLUTION INTRAVENOUS
Status: COMPLETED | OUTPATIENT
Start: 2025-08-22 | End: 2025-08-22

## 2025-08-22 RX ORDER — NALOXONE HYDROCHLORIDE 0.4 MG/ML
0.2 INJECTION, SOLUTION INTRAMUSCULAR; INTRAVENOUS; SUBCUTANEOUS
Status: DISCONTINUED | OUTPATIENT
Start: 2025-08-22 | End: 2025-08-22 | Stop reason: HOSPADM

## 2025-08-22 RX ORDER — ACETAMINOPHEN 325 MG/1
975 TABLET ORAL ONCE
Status: DISCONTINUED | OUTPATIENT
Start: 2025-08-22 | End: 2025-08-22 | Stop reason: HOSPADM

## 2025-08-22 RX ORDER — MAGNESIUM SULFATE HEPTAHYDRATE 40 MG/ML
2 INJECTION, SOLUTION INTRAVENOUS ONCE
Status: COMPLETED | OUTPATIENT
Start: 2025-08-22 | End: 2025-08-22

## 2025-08-22 RX ORDER — HYDROXYZINE HYDROCHLORIDE 25 MG/1
25 TABLET, FILM COATED ORAL EVERY 8 HOURS PRN
Qty: 30 TABLET | Refills: 1 | Status: SHIPPED | OUTPATIENT
Start: 2025-08-22

## 2025-08-22 RX ORDER — ONDANSETRON 4 MG/1
4 TABLET, ORALLY DISINTEGRATING ORAL EVERY 30 MIN PRN
Status: DISCONTINUED | OUTPATIENT
Start: 2025-08-22 | End: 2025-08-22 | Stop reason: HOSPADM

## 2025-08-22 RX ADMIN — MIDAZOLAM 2 MG: 1 INJECTION INTRAMUSCULAR; INTRAVENOUS at 12:12

## 2025-08-22 RX ADMIN — PROPOFOL 150 MG: 10 INJECTION, EMULSION INTRAVENOUS at 12:21

## 2025-08-22 RX ADMIN — FENTANYL CITRATE 50 MCG: 50 INJECTION INTRAMUSCULAR; INTRAVENOUS at 13:16

## 2025-08-22 RX ADMIN — DEXMEDETOMIDINE HYDROCHLORIDE 40 MCG: 4 INJECTION, SOLUTION INTRAVENOUS at 12:32

## 2025-08-22 RX ADMIN — PHENAZOPYRIDINE 200 MG: 200 TABLET ORAL at 10:42

## 2025-08-22 RX ADMIN — ONDANSETRON 4 MG: 2 INJECTION INTRAMUSCULAR; INTRAVENOUS at 14:33

## 2025-08-22 RX ADMIN — GABAPENTIN 300 MG: 300 CAPSULE ORAL at 10:42

## 2025-08-22 RX ADMIN — SCOPOLAMINE 1 PATCH: 1.5 PATCH, EXTENDED RELEASE TRANSDERMAL at 10:42

## 2025-08-22 RX ADMIN — ACETAMINOPHEN 975 MG: 325 TABLET ORAL at 10:42

## 2025-08-22 RX ADMIN — Medication 20 MG: at 13:30

## 2025-08-22 RX ADMIN — ACETAMINOPHEN 975 MG: 325 TABLET ORAL at 16:39

## 2025-08-22 RX ADMIN — Medication 200 MG: at 14:36

## 2025-08-22 RX ADMIN — Medication 50 MG: at 12:22

## 2025-08-22 RX ADMIN — FENTANYL CITRATE 50 MCG: 50 INJECTION INTRAMUSCULAR; INTRAVENOUS at 13:12

## 2025-08-22 RX ADMIN — MAGNESIUM SULFATE HEPTAHYDRATE 2 G: 40 INJECTION, SOLUTION INTRAVENOUS at 13:31

## 2025-08-22 RX ADMIN — SODIUM CHLORIDE, SODIUM LACTATE, POTASSIUM CHLORIDE, AND CALCIUM CHLORIDE: .6; .31; .03; .02 INJECTION, SOLUTION INTRAVENOUS at 14:44

## 2025-08-22 RX ADMIN — PROPOFOL 75 MCG/KG/MIN: 10 INJECTION, EMULSION INTRAVENOUS at 12:21

## 2025-08-22 RX ADMIN — SODIUM CHLORIDE, SODIUM LACTATE, POTASSIUM CHLORIDE, AND CALCIUM CHLORIDE: .6; .31; .03; .02 INJECTION, SOLUTION INTRAVENOUS at 12:15

## 2025-08-22 RX ADMIN — LIDOCAINE HYDROCHLORIDE 80 MG: 20 INJECTION, SOLUTION INFILTRATION; PERINEURAL at 12:20

## 2025-08-22 RX ADMIN — OXYCODONE HYDROCHLORIDE 5 MG: 5 TABLET ORAL at 16:39

## 2025-08-22 RX ADMIN — BUPIVACAINE HYDROCHLORIDE 60 ML: 2.5 INJECTION, SOLUTION EPIDURAL; INFILTRATION; INTRACAUDAL; PERINEURAL at 12:32

## 2025-08-22 RX ADMIN — DEXAMETHASONE SODIUM PHOSPHATE 8 MG: 4 INJECTION, SOLUTION INTRAMUSCULAR; INTRAVENOUS at 12:21

## 2025-08-22 RX ADMIN — FENTANYL CITRATE 100 MCG: 50 INJECTION INTRAMUSCULAR; INTRAVENOUS at 12:22

## 2025-08-22 RX ADMIN — HYDROMORPHONE HYDROCHLORIDE 0.5 MG: 1 INJECTION, SOLUTION INTRAMUSCULAR; INTRAVENOUS; SUBCUTANEOUS at 14:21

## 2025-08-22 ASSESSMENT — ACTIVITIES OF DAILY LIVING (ADL)
ADLS_ACUITY_SCORE: 35
ADLS_ACUITY_SCORE: 36
ADLS_ACUITY_SCORE: 35
ADLS_ACUITY_SCORE: 36
ADLS_ACUITY_SCORE: 35
ADLS_ACUITY_SCORE: 35

## 2025-08-28 LAB
PATH REPORT.COMMENTS IMP SPEC: NORMAL
PATH REPORT.COMMENTS IMP SPEC: NORMAL
PATH REPORT.FINAL DX SPEC: NORMAL
PATH REPORT.GROSS SPEC: NORMAL
PATH REPORT.MICROSCOPIC SPEC OTHER STN: NORMAL
PATH REPORT.RELEVANT HX SPEC: NORMAL
PHOTO IMAGE: NORMAL

## 2025-09-02 ENCOUNTER — TELEPHONE (OUTPATIENT)
Dept: OBGYN | Facility: CLINIC | Age: 40
End: 2025-09-02
Payer: COMMERCIAL

## (undated) DEVICE — DAVINCI XI DRAPE COLUMN 470341

## (undated) DEVICE — UNDERPAD 36X30 PREMIERPRO MAX ABS NS LF 676111

## (undated) DEVICE — DAVINCI XI SEAL UNIVERSAL 5-12MM 470500

## (undated) DEVICE — SU DERMABOND ADVANCED .7ML DNX12

## (undated) DEVICE — GLOVE PROTEXIS MICRO 7.0 LT BLUE 2D73PM70

## (undated) DEVICE — DAVINCI XI OBTURATOR BLADELESS 8MM 470359

## (undated) DEVICE — KIT POSITIONER BLUE PAD EXT W/ARM PROT & HEADREST LF TP3000E

## (undated) DEVICE — DRAPE POUCH IRR 1016

## (undated) DEVICE — PAD PERI INDIV WRAP 11" NON241286

## (undated) DEVICE — LINEN ORTHO PACK 5446

## (undated) DEVICE — SUCTION MANIFOLD NEPTUNE 2 SYS 4 PORT 0702-020-000

## (undated) DEVICE — CLEANER INST PRE-KLENZ SOAK SHIELD TUBE 6 ML MEDIUM 2D66J4

## (undated) DEVICE — DAVINCI XI DRAPE ARM 470015

## (undated) DEVICE — ANTIFOG SOLUTION SEE SHARP 150M TROCAR SWABS 30978 (COI)

## (undated) DEVICE — ENDO POUCH UNIVERSAL RETRIEVAL SYSTEM INZII 5MM CD003

## (undated) DEVICE — SU MONOCRYL 4-0 PS-2 18" UND Y496G

## (undated) DEVICE — PACK DAVINCI RIVERSIDE LF SAN15UPM36

## (undated) DEVICE — COVER CAMERA IN-LIGHT DISP LT-C02

## (undated) DEVICE — DAVINCI HOT SHEARS TIP COVER  400180

## (undated) DEVICE — ENDO TROCAR CONMED AIRSEAL BLADELESS 08X120MM IAS8-120LP

## (undated) DEVICE — SYR 50ML LL W/O NDL 309653

## (undated) DEVICE — SOLUTION IV 0.9% NACL 1000ML E8000

## (undated) DEVICE — PREP CHLORAPREP 26ML TINTED HI-LITE ORANGE 930815

## (undated) DEVICE — CATH TRAY FOLEY SURESTEP 16FR WDRAIN BAG STLK LATEX A300316A

## (undated) DEVICE — DRAPE UNDER BUTTOCK 8483

## (undated) DEVICE — NDL INSUFFLATION 13GA 120MM C2201

## (undated) DEVICE — PREP DYNA-HEX 4% CHG SCRUB 4OZ BOTTLE MDS098710

## (undated) DEVICE — TUBING FILTER TRI-LUMEN AIRSEAL ASC-EVAC1

## (undated) DEVICE — WIPES FOLEY CARE SURESTEP PROVON DFC100

## (undated) DEVICE — GLOVE PROTEXIS BLUE W/NEU-THERA 7.5  2D73EB75

## (undated) DEVICE — SYR 10ML SLIP TIP W/O NDL 303134

## (undated) RX ORDER — ACETAMINOPHEN 325 MG/1
TABLET ORAL
Status: DISPENSED
Start: 2025-08-22

## (undated) RX ORDER — FENTANYL CITRATE 50 UG/ML
INJECTION, SOLUTION INTRAMUSCULAR; INTRAVENOUS
Status: DISPENSED
Start: 2025-08-22

## (undated) RX ORDER — PROPOFOL 10 MG/ML
INJECTION, EMULSION INTRAVENOUS
Status: DISPENSED
Start: 2025-08-22

## (undated) RX ORDER — HYDROMORPHONE HYDROCHLORIDE 1 MG/ML
INJECTION, SOLUTION INTRAMUSCULAR; INTRAVENOUS; SUBCUTANEOUS
Status: DISPENSED
Start: 2025-08-22

## (undated) RX ORDER — OXYCODONE HYDROCHLORIDE 5 MG/1
TABLET ORAL
Status: DISPENSED
Start: 2025-08-22

## (undated) RX ORDER — GABAPENTIN 300 MG/1
CAPSULE ORAL
Status: DISPENSED
Start: 2025-08-22

## (undated) RX ORDER — SCOPOLAMINE 1 MG/3D
PATCH, EXTENDED RELEASE TRANSDERMAL
Status: DISPENSED
Start: 2025-08-22

## (undated) RX ORDER — ONDANSETRON 2 MG/ML
INJECTION INTRAMUSCULAR; INTRAVENOUS
Status: DISPENSED
Start: 2025-08-22

## (undated) RX ORDER — PHENAZOPYRIDINE HYDROCHLORIDE 200 MG/1
TABLET, FILM COATED ORAL
Status: DISPENSED
Start: 2025-08-22